# Patient Record
Sex: MALE | URBAN - METROPOLITAN AREA
[De-identification: names, ages, dates, MRNs, and addresses within clinical notes are randomized per-mention and may not be internally consistent; named-entity substitution may affect disease eponyms.]

---

## 2019-06-09 ENCOUNTER — OFFICE VISIT (OUTPATIENT)
Dept: URGENT CARE | Facility: URGENT CARE | Age: 49
End: 2019-06-09
Payer: COMMERCIAL

## 2019-06-09 VITALS
TEMPERATURE: 98.1 F | RESPIRATION RATE: 16 BRPM | WEIGHT: 194.8 LBS | OXYGEN SATURATION: 100 % | BODY MASS INDEX: 29.62 KG/M2 | SYSTOLIC BLOOD PRESSURE: 123 MMHG | DIASTOLIC BLOOD PRESSURE: 85 MMHG | HEART RATE: 83 BPM

## 2019-06-09 DIAGNOSIS — R19.7 DIARRHEA, UNSPECIFIED TYPE: Primary | ICD-10-CM

## 2019-06-09 DIAGNOSIS — E86.0 DEHYDRATION: ICD-10-CM

## 2019-06-09 LAB
C DIFF TOX B STL QL: NEGATIVE
SPECIMEN SOURCE: NORMAL

## 2019-06-09 PROCEDURE — 87506 IADNA-DNA/RNA PROBE TQ 6-11: CPT | Performed by: PHYSICIAN ASSISTANT

## 2019-06-09 PROCEDURE — 87338 HPYLORI STOOL AG IA: CPT | Performed by: PHYSICIAN ASSISTANT

## 2019-06-09 PROCEDURE — 99213 OFFICE O/P EST LOW 20 MIN: CPT | Performed by: PHYSICIAN ASSISTANT

## 2019-06-09 PROCEDURE — 87209 SMEAR COMPLEX STAIN: CPT | Performed by: PHYSICIAN ASSISTANT

## 2019-06-09 PROCEDURE — 87177 OVA AND PARASITES SMEARS: CPT | Performed by: PHYSICIAN ASSISTANT

## 2019-06-09 PROCEDURE — 87493 C DIFF AMPLIFIED PROBE: CPT | Performed by: PHYSICIAN ASSISTANT

## 2019-06-09 PROCEDURE — 87329 GIARDIA AG IA: CPT | Performed by: PHYSICIAN ASSISTANT

## 2019-06-09 RX ORDER — LOPERAMIDE HYDROCHLORIDE 2 MG/1
2 TABLET ORAL 4 TIMES DAILY PRN
Qty: 12 TABLET | Refills: 0 | Status: SHIPPED | OUTPATIENT
Start: 2019-06-09

## 2019-06-09 NOTE — LETTER
[facility]  [address]  [place] [address]  Phone: [phone]    06/11/19    Wade Rangel  [address] [place] [address]      To whom it may concern:     The results of your recent lab results were normal. Enclosed are a copy of the results. Please call us with any questions/concerns regarding your results. Thank you for choosing [facility]. Have a great day!  Results for orders placed or performed in visit on 06/09/19   H Pylori antigen stool   Result Value Ref Range    Specimen Description Feces     H Pylori Antigen       Negative for Helicobacter pylori antigen by enzyme immunoassay. A negative result   indicates the absence of H. pylori antigen or that the level of antigen is below the level   of detection.     Clostridium difficile toxin B PCR   Result Value Ref Range    Specimen Description Feces     C Diff Toxin B PCR Negative NEG^Negative   Enteric Bacteria and Virus Panel by HOLLY Stool   Result Value Ref Range    Campylobacter group by HOLLY Not Detected NDET^Not Detected    Salmonella species by HOLLY Not Detected NDET^Not Detected    Shigella species by HOLLY Not Detected NDET^Not Detected    Vibrio group by HOLLY Not Detected NDET^Not Detected    Rotavirus A by HOLLY Not Detected NDET^Not Detected    Shiga toxin 1 gene by HOLLY Not Detected NDET^Not Detected    Shiga toxin 2 gene by HOLLY Not Detected NDET^Not Detected    Norovirus I and II by HOLLY Not Detected NDET^Not Detected    Yersinia enterocolitica by HOLLY Not Detected NDET^Not Detected    Enteric pathogen comment       Testing performed by multiplexed, qualitative PCR using the [employer] Enteric   Pathogens Nucleic Acid Test. Results should not be used as the sole basis for diagnosis,   treatment, or other patient management decisions.     Giardia antigen   Result Value Ref Range    Specimen Description Feces     Giardia Antigen Test       Negative for Giardia lamblia specific antigen by  immunoassay.   Ova and Parasite Exam Routine   Result Value Ref Range    Specimen Description Feces     Ova and Parasite Exam Routine parasitology exam negative     Ova and Parasite Exam Few  PMNs seen       Ova and Parasite Exam       Cryptosporidium, Cyclospora, and Microsporidia are not readily detected by this method. A   single negative specimen does not rule out parasitic infection.           Sincerely,      Magaly Connell PA-C

## 2019-06-09 NOTE — PROGRESS NOTES
S: 49-year-old male presents for evaluation of diarrhea for 6 days.  No blood or mucus in the diarrhea.  Prior to coming here this morning had 7 episodes of diarrhea.  Diarrhea is pure water.  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  He tried some over-the-counter antidiarrheal diarrheal medication without relief.  The first day he did vomit once.  None son since.  He reports abdominal cramping that is relieved once he has a bowel movement for a period of time.  No fever.  No recent travel.  No recent antibiotics.  No sore throat or cough.  No rash.  He has hardly been eating or drinking anything.  This illness did not seem to come on after eating any certain food.  He denies any close contacts or family members with similar illness.      No Known Allergies    History reviewed. No pertinent past medical history.  He is not diabetic.      Current Outpatient Medications on File Prior to Visit:  IBUPROFEN 800 MG OR TABS 1 TABLET 3 TIMES DAILY AS NEEDED   prednisoLONE acetate (PRED FORTE) 1 % ophthalmic suspension Place 1 drop into the right eye 4 times daily. (Patient not taking: Reported on 6/9/2019)     No current facility-administered medications on file prior to visit.     Social History     Tobacco Use     Smoking status: Current Every Day Smoker     Types: Cigarettes     Smokeless tobacco: Never Used     Tobacco comment: Uses Plexx   Substance Use Topics     Alcohol use: No       ROS:  CONSTITUTIONAL: Negative for fatigue or fever.  EYES: Negative for eye problems.  ENT: As above.  RESP: As above.  CV: Negative for chest pains.  GI: Negative for vomiting.  MUSCULOSKELETAL:  Negative for significant muscle or joint pains.  NEUROLOGIC: Negative for headaches.  SKIN: Negative for rash.    OBJECTIVE:  /85   Pulse 83   Temp 98.1  F (36.7  C) (Oral)   Resp 16   Wt 88.4 kg (194 lb 12.8 oz)   SpO2 100%   BMI 29.62 kg/m       GENERAL APPEARANCE: Appears healthy and alert.    EYES:Conjunctiva/sclera  clear.  EARS: No cerumen.   Ear canals w/o erythema.  TM's intact w/o erythema.    THROAT: No erythema w/o tonsillar enlargement . No exudates.  NECK: Supple, nontender, no lymphadenopathy.  RESP: Lungs clear to auscultation - no rales, rhonchi or wheezes  CV: Regular rate and rhythm, normal S1 S2, no murmur noted.  NEURO: Awake, alert    SKIN: No rashes  Abdomen-normal active bowel sounds.  No hepatosplenomegaly.  Nontender.  No rigidity, guarding or rebound.  Mouth shows tongue to be a little white.  Buccal mucosa is moist.    ASSESSMENT:     ICD-10-CM    1. Diarrhea, unspecified type R19.7 loperamide (IMODIUM A-D) 2 MG tablet     Clostridium difficile toxin B PCR     Enteric Bacteria and Virus Panel by HOLLY Stool     Giardia antigen     H Pylori antigen stool     Ova and Parasite Exam Routine   2. Dehydration E86.0            PLAN: Patient is upset as he has waited an hour since arrival.  Did apologize and explained that this is a walk-in clinic and we do not take appointments.  He asked why he has not seen a doctor.  I did explain that there were no MDs on an urgent care this weekend and that I am a mid-level professional.  I suggested with the severity and the length of the diarrhea he should likely go to the emergency room for some IV fluids and further evaluation.  I explained that I am unable to check electrolytes here such as sodium and potassium and get it back in a timely fashion.  Electrolyte imbalances can cause additional medical issues.  He refuses to go to the emergency room.  I then recommend stool samples to check for the most common bacteria and viruses.  He refuses saying he is not going to catch his pure water diarrhea in a sample.  I explained that I cannot help him unless he is going to follow my recommendations.  He is upset that I am not medical doctor.  At this point I do not know what further I can say.  I gave him a prescription for some Imodium, told him to try Gatorade and brat diet.   Iff he is not better in 24 hours I recommend he go to the emergency room.    Magaly Connell PA-C

## 2019-06-10 LAB
C COLI+JEJUNI+LARI FUSA STL QL NAA+PROBE: NOT DETECTED
EC STX1 GENE STL QL NAA+PROBE: NOT DETECTED
EC STX2 GENE STL QL NAA+PROBE: NOT DETECTED
ENTERIC PATHOGEN COMMENT: NORMAL
G LAMBLIA AG STL QL IA: NORMAL
H PYLORI AG STL QL IA: NORMAL
NOROV GI+II ORF1-ORF2 JNC STL QL NAA+PR: NOT DETECTED
O+P STL MICRO: NORMAL
RVA NSP5 STL QL NAA+PROBE: NOT DETECTED
SALMONELLA SP RPOD STL QL NAA+PROBE: NOT DETECTED
SHIGELLA SP+EIEC IPAH STL QL NAA+PROBE: NOT DETECTED
SPECIMEN SOURCE: NORMAL
V CHOL+PARA RFBL+TRKH+TNAA STL QL NAA+PR: NOT DETECTED
Y ENTERO RECN STL QL NAA+PROBE: NOT DETECTED

## 2019-06-12 ENCOUNTER — TELEPHONE (OUTPATIENT)
Dept: URGENT CARE | Facility: URGENT CARE | Age: 49
End: 2019-06-12

## 2019-06-12 NOTE — TELEPHONE ENCOUNTER
Reason for Call:  Request for results:    Name of test or procedure: Stool tests    Date of test of procedure: 6/9/2019    Location of the test or procedure: [facility]    OK to leave the result message on voice mail or with a family member? YES    Phone number Patient can be reached at:  [phone]    Additional comments: please call back    Call taken on 6/12/2019 at 3:53 PM by Gina Fraire